# Patient Record
Sex: FEMALE | Race: WHITE | Employment: FULL TIME | URBAN - METROPOLITAN AREA
[De-identification: names, ages, dates, MRNs, and addresses within clinical notes are randomized per-mention and may not be internally consistent; named-entity substitution may affect disease eponyms.]

---

## 2017-10-27 ENCOUNTER — HOSPITAL ENCOUNTER (OUTPATIENT)
Dept: NON INVASIVE DIAGNOSTICS | Age: 52
Discharge: HOME | End: 2017-10-27
Attending: INTERNAL MEDICINE | Admitting: INTERNAL MEDICINE

## 2017-10-27 DIAGNOSIS — I47.1 SUPRAVENTRICULAR TACHYCARDIA (HCC): ICD-10-CM

## 2017-10-27 LAB
LEFT VENTRICULAR EJECTION FRACTION HIGH VALUE: 55 %
LEFT VENTRICULAR EJECTION FRACTION MODE: NORMAL
LV EF: 55 %
LVEF MODALITY: NORMAL

## 2017-10-31 ENCOUNTER — OFFICE VISIT (OUTPATIENT)
Dept: CARDIOLOGY CLINIC | Age: 52
End: 2017-10-31

## 2017-10-31 VITALS
DIASTOLIC BLOOD PRESSURE: 84 MMHG | SYSTOLIC BLOOD PRESSURE: 130 MMHG | BODY MASS INDEX: 23.07 KG/M2 | HEART RATE: 86 BPM | HEIGHT: 67 IN | WEIGHT: 147 LBS

## 2017-10-31 DIAGNOSIS — I51.89 RIGHT ATRIAL MASS: Primary | ICD-10-CM

## 2017-10-31 DIAGNOSIS — I47.1 PSVT (PAROXYSMAL SUPRAVENTRICULAR TACHYCARDIA) (HCC): ICD-10-CM

## 2017-10-31 PROCEDURE — G8484 FLU IMMUNIZE NO ADMIN: HCPCS | Performed by: INTERNAL MEDICINE

## 2017-10-31 PROCEDURE — 3014F SCREEN MAMMO DOC REV: CPT | Performed by: INTERNAL MEDICINE

## 2017-10-31 PROCEDURE — G8420 CALC BMI NORM PARAMETERS: HCPCS | Performed by: INTERNAL MEDICINE

## 2017-10-31 PROCEDURE — G8427 DOCREV CUR MEDS BY ELIG CLIN: HCPCS | Performed by: INTERNAL MEDICINE

## 2017-10-31 PROCEDURE — 3017F COLORECTAL CA SCREEN DOC REV: CPT | Performed by: INTERNAL MEDICINE

## 2017-10-31 PROCEDURE — 99244 OFF/OP CNSLTJ NEW/EST MOD 40: CPT | Performed by: INTERNAL MEDICINE

## 2017-10-31 NOTE — PROGRESS NOTES
[address]  Cardiac Consult     Referring Provider:  Mann Pitt     Chief Complaint   Patient presents with   Luis Coley Cardiologist     Pt had SVT in the ED a couple weeks ago. Other then that she states she has not felt any cardiac symptoms        History of Present Illness:  48 y/o former smoker seen in consultation at the request of Dr. Carlos Siddiqui for abnormal ECHO suggesting myxoma. She has had episodes of dizziness with no associated symptoms. A couple of weeks ago she had a severe episode and went to the ER and was found to have SVT at 200 bpm. She was placed on lopressor and has not had any recurrence. No clear exacerbating factors. Episodes occurring over the last couple months. She saw her PCP and an ECHO was obtained. This suggested a Right atrial myxoma. However, she does have a large atrial septal aneurysm that could be the etiology of the abnormality. Past Medical History:   has a past medical history of Anxiety. Surgical History:   has a past surgical history that includes shoulder surgery; back surgery; and  section. Social History:   reports that she quit smoking about 5 months ago. Her smoking use included E-Cigarettes and Cigarettes. She has never used smokeless tobacco. She reports that she drinks about 1.2 oz of alcohol per week . She reports that she does not use drugs. Family History:  family history includes Coronary Art Dis in her maternal grandfather; High Blood Pressure in her father; Other in her maternal grandfather.      Allergies:  Codeine and Penicillins     Home Medications:  Outpatient Encounter Prescriptions as of 10/31/2017   Medication Sig Dispense Refill    BUSPIRONE HCL PO Take by mouth      Albuterol Sulfate (PROAIR HFA IN) Inhale into the lungs      meloxicam (MOBIC) 15 MG tablet Take 15 mg by mouth daily      Tiotropium Bromide Monohydrate (SPIRIVA HANDIHALER IN) Inhale into the lungs      buPROPion (WELLBUTRIN XL) 300 MG

## 2017-10-31 NOTE — LETTER
Outpatient Encounter Prescriptions as of 10/31/2017   Medication Sig Dispense Refill    BUSPIRONE HCL PO Take by mouth      Albuterol Sulfate (PROAIR HFA IN) Inhale into the lungs      meloxicam (MOBIC) 15 MG tablet Take 15 mg by mouth daily      Tiotropium Bromide Monohydrate (SPIRIVA HANDIHALER IN) Inhale into the lungs      buPROPion (WELLBUTRIN XL) 300 MG extended release tablet Take 300 mg by mouth every morning      metoprolol succinate (TOPROL XL) 25 MG extended release tablet Take 1 tablet by mouth daily 15 tablet 0    Cyclobenzaprine HCl (FLEXERIL PO) Take 10 mg by mouth 3 times daily as needed       No facility-administered encounter medications on file as of 10/31/2017. Medications and dosages reviewed. ROS:  Full ROS obtained and negative except as mentioned in HPI      Physical Examination:    Vitals:    10/31/17 1115   BP: 130/84   Pulse: 86        · GENERAL: Well developed, well nourished, No acute distress  · NEUROLOGICAL: Alert and oriented  · PSYCH: Calm affect  · SKIN: Warm and dry  · HEENT: Normocephalic, Sclera non-icteric, mucus membranes moist  · NECK: supple, JVP normal  · CAROTID: Normal upstroke, no bruits  · CARDIAC: Normal PMI, regular rate and rhythm, normal S1S2, no murmur, rub, or gallop  · RESPIRATORY: Normal respiratory effort, clear to auscultation bilaterally  · EXTREMITIES: No edema  · MUSCULOSKELETAL: No joint swelling or tenderness, no chest wall tenderness  · GASTROINTESTINAL: normal bowel sounds, soft, non-tender, no bruit      Assessment:     Right atrial mass: Concerning for myxoma. However, this could be artifactual due to her atrial septal aneurysm. I have recommended SHLOMO. Discussed in detail with pt and family including risks and benefits. She understands and wishes to proceed. No h/o esophageal pathology and no swallowing difficulty. SVT: currently resolved on lopressor.  Follow    Plan:    SHLOMO Thank you for allowing me to participate in the care of this individual.      Rody Hernández M.D., [facility]         If you have questions, please do not hesitate to call me. I look forward to following Francheska Ford along with you.     Sincerely,        Sebastian Collier MD

## 2017-11-01 ENCOUNTER — HOSPITAL ENCOUNTER (OUTPATIENT)
Dept: MAMMOGRAPHY | Age: 52
Discharge: HOME | End: 2017-11-01

## 2017-11-01 DIAGNOSIS — Z12.39 BREAST CANCER SCREENING: ICD-10-CM

## 2017-11-01 NOTE — COMMUNICATION BODY
extended release tablet Take 300 mg by mouth every morning      metoprolol succinate (TOPROL XL) 25 MG extended release tablet Take 1 tablet by mouth daily 15 tablet 0    Cyclobenzaprine HCl (FLEXERIL PO) Take 10 mg by mouth 3 times daily as needed       No facility-administered encounter medications on file as of 10/31/2017. [x] Medications and dosages reviewed. ROS:  [x]Full ROS obtained and negative except as mentioned in HPI      Physical Examination:    Vitals:    10/31/17 1115   BP: 130/84   Pulse: 86        · GENERAL: Well developed, well nourished, No acute distress  · NEUROLOGICAL: Alert and oriented  · PSYCH: Calm affect  · SKIN: Warm and dry  · HEENT: Normocephalic, Sclera non-icteric, mucus membranes moist  · NECK: supple, JVP normal  · CAROTID: Normal upstroke, no bruits  · CARDIAC: Normal PMI, regular rate and rhythm, normal S1S2, no murmur, rub, or gallop  · RESPIRATORY: Normal respiratory effort, clear to auscultation bilaterally  · EXTREMITIES: No edema  · MUSCULOSKELETAL: No joint swelling or tenderness, no chest wall tenderness  · GASTROINTESTINAL: normal bowel sounds, soft, non-tender, no bruit      Assessment:     Right atrial mass: Concerning for myxoma. However, this could be artifactual due to her atrial septal aneurysm. I have recommended SHLOMO. Discussed in detail with pt and family including risks and benefits. She understands and wishes to proceed. No h/o esophageal pathology and no swallowing difficulty. SVT: currently resolved on lopressor.  Follow    Plan:    SHLOMO    Thank you for allowing me to participate in the care of this individual.      Margarita Jameson M.D., [facility] [place]

## 2017-11-16 ENCOUNTER — OFFICE VISIT (OUTPATIENT)
Dept: CARDIOLOGY CLINIC | Age: 52
End: 2017-11-16

## 2017-11-16 VITALS
DIASTOLIC BLOOD PRESSURE: 86 MMHG | BODY MASS INDEX: 23.39 KG/M2 | OXYGEN SATURATION: 95 % | HEART RATE: 93 BPM | WEIGHT: 149 LBS | HEIGHT: 67 IN | SYSTOLIC BLOOD PRESSURE: 124 MMHG

## 2017-11-16 DIAGNOSIS — I51.89 RIGHT ATRIAL MASS: Primary | ICD-10-CM

## 2017-11-16 DIAGNOSIS — I47.1 SVT (SUPRAVENTRICULAR TACHYCARDIA) (HCC): ICD-10-CM

## 2017-11-16 PROCEDURE — 99213 OFFICE O/P EST LOW 20 MIN: CPT | Performed by: NURSE PRACTITIONER

## 2017-11-16 PROCEDURE — 1036F TOBACCO NON-USER: CPT | Performed by: NURSE PRACTITIONER

## 2017-11-16 PROCEDURE — G8484 FLU IMMUNIZE NO ADMIN: HCPCS | Performed by: NURSE PRACTITIONER

## 2017-11-16 PROCEDURE — 93000 ELECTROCARDIOGRAM COMPLETE: CPT | Performed by: NURSE PRACTITIONER

## 2017-11-16 PROCEDURE — 3017F COLORECTAL CA SCREEN DOC REV: CPT | Performed by: NURSE PRACTITIONER

## 2017-11-16 PROCEDURE — G8427 DOCREV CUR MEDS BY ELIG CLIN: HCPCS | Performed by: NURSE PRACTITIONER

## 2017-11-16 PROCEDURE — 3014F SCREEN MAMMO DOC REV: CPT | Performed by: NURSE PRACTITIONER

## 2017-11-16 PROCEDURE — G8420 CALC BMI NORM PARAMETERS: HCPCS | Performed by: NURSE PRACTITIONER

## 2017-11-16 RX ORDER — ADHESIVE BANDAGE 3/4"
1 BANDAGE TOPICAL DAILY
Qty: 1 EACH | Refills: 0 | Status: SHIPPED | OUTPATIENT
Start: 2017-11-16

## 2017-11-16 NOTE — LETTER
[address] [address] [address] [address]  Phone: [phone]  Fax: [phone]    Lorenzo Parr NP        November 16, 2017     Michael Mcneal, [address] [place] [address]  [id number]    Patient: Rosa Vallecillo  MR Number: [id number]  YOB: 1965  Date of Visit: 11/16/2017    Dear Dr. Michael Mcneal:    Thank you for the request for consultation for Russell Cogan to me for the evaluation of SVT. Below are the relevant portions of my assessment and plan of care. [address]     Outpatient Follow Up Note    CHIEF COMPLAINT / HPI: Follow Up secondary to  Atrial septal aneurysm/ SVT     Rosa Vallecillo is 47 y.o. female who presents today for a routine follow up  related to the above mentioned issues. Subjective:   Since the time of last office visit, the patient admits their symptoms have improved. At today's visit the patient is doing well. Patient recently s/p SHLOMO to r/o RA mass which showed no mass and large atrial septal aneurysm   She denies any chest pain, palpitations, SOB, dizziness, or edema. Today's EKG showed SR. With regard to medication therapy the patient has been compliant with prescribed regimen. They have tolerated therapy to date.      Past Medical History:   Diagnosis Date    Anxiety      Social History:    History   Smoking Status    Former Smoker    Types: E-Cigarettes, Cigarettes    Quit date: 5/31/2017   Smokeless Tobacco    Never Used     Current Medications:  Current Outpatient Prescriptions   Medication Sig Dispense Refill    metoprolol succinate (TOPROL XL) 25 MG extended release tablet Take 1 tablet by mouth daily 30 tablet 5    BUSPIRONE HCL PO Take by mouth      Albuterol Sulfate (PROAIR HFA IN) Inhale into the lungs      Cyclobenzaprine HCl (FLEXERIL PO) Take 10 mg by mouth 3 times daily as needed      meloxicam (MOBIC) 15 MG tablet Take 15 mg by mouth daily  Tiotropium Bromide Monohydrate (SPIRIVA HANDIHALER IN) Inhale into the lungs      buPROPion (WELLBUTRIN XL) 300 MG extended release tablet Take 300 mg by mouth every morning       No current facility-administered medications for this visit. REVIEW OF SYSTEMS:   CONSTITUTIONAL: No major weight gain or loss, fatigue, weakness, night sweats or fever. There's been no change in energy level, sleep pattern, or activity level. HEENT: No new vision difficulties or ringing in the ears. RESPIRATORY: No new SOB, PND, orthopnea or cough. CARDIOVASCULAR: See HPI  GI: No nausea, vomiting, diarrhea, constipation, abdominal pain or changes in bowel habits. : No urinary frequency, urgency, incontinence hematuria or dysuria. SKIN: No cyanosis or skin lesions. MUSCULOSKELETAL: No new muscle or joint pain. NEUROLOGICAL: No syncope or TIA-like symptoms. PSYCHIATRIC: No anxiety, pain, insomnia or depression    Objective:   PHYSICAL EXAM:        VITALS:    Vitals:    11/16/17 1412   BP: 124/86   Pulse: 93   SpO2: 95%         CONSTITUTIONAL: Cooperative, no apparent distress, and appears well nourished / developed  NEUROLOGIC:  Awake and orientated to person, place and time. PSYCH: Calm affect. SKIN: Warm and dry. HEENT: Sclera non-icteric, normocephalic, neck supple, no elevation of JVP, normal carotid pulses with no bruits and thyroid normal size. LUNGS:  No increased work of breathing and clear to auscultation, no crackles or wheezing. CARDIOVASCULAR:  Regular rate and rhythm with no murmurs, gallops, rubs, or abnormal heart sounds, normal PMI. The apical impulses not displaced.  Heart tones are crisp and normal. Cervical veins are not engorged                 JVP less than 8 cm H2O                                                                              The carotid upstroke is normal in amplitude and contour without delay or bruit    ABDOMEN:  Normal bowel sounds, non-distended and non-tender to palpation The patient  currently  is not smoking. The risks related to smoking were reviewed with the patient. Recommend maintaining a smoke-free lifestyle. Products available for smoking cessation were discussed. Angiotension inhibitor/angiotension receptor blocker has been prescribed / recommended for congestive heart failure. Daily weight, low sodium diet were discussed. Patient instructed to call the office with a weight gain: > 3 # over night or 5# in one week; swelling, SOB/orthopnea/PND    Thank you for allowing to us to participate in the care of Mckenna Phillips. [facility] CNP         If you have questions, please do not hesitate to call me. I look forward to following Jameel Iniguez along with you.     Sincerely,        Ruth Oh NP

## 2017-11-16 NOTE — PROGRESS NOTES
[facility]     Outpatient Follow Up Note    CHIEF COMPLAINT / HPI: Follow Up secondary to  Atrial septal aneurysm/ SVT     Klaudia Chang is 47 y.o. female who presents today for a routine follow up  related to the above mentioned issues. Subjective:   Since the time of last office visit, the patient admits their symptoms have improved. At today's visit the patient is doing well. Patient recently s/p SHLOMO to r/o RA mass which showed no mass and large atrial septal aneurysm   She denies any chest pain, palpitations, SOB, dizziness, or edema. Today's EKG showed SR. With regard to medication therapy the patient has been compliant with prescribed regimen. They have tolerated therapy to date. Past Medical History:   Diagnosis Date    Anxiety      Social History:    History   Smoking Status    Former Smoker    Types: E-Cigarettes, Cigarettes    Quit date: 5/31/2017   Smokeless Tobacco    Never Used     Current Medications:  Current Outpatient Prescriptions   Medication Sig Dispense Refill    metoprolol succinate (TOPROL XL) 25 MG extended release tablet Take 1 tablet by mouth daily 30 tablet 5    BUSPIRONE HCL PO Take by mouth      Albuterol Sulfate (PROAIR HFA IN) Inhale into the lungs      Cyclobenzaprine HCl (FLEXERIL PO) Take 10 mg by mouth 3 times daily as needed      meloxicam (MOBIC) 15 MG tablet Take 15 mg by mouth daily      Tiotropium Bromide Monohydrate (SPIRIVA HANDIHALER IN) Inhale into the lungs      buPROPion (WELLBUTRIN XL) 300 MG extended release tablet Take 300 mg by mouth every morning       No current facility-administered medications for this visit. REVIEW OF SYSTEMS:   CONSTITUTIONAL: No major weight gain or loss, fatigue, weakness, night sweats or fever. There's been no change in energy level, sleep pattern, or activity level. HEENT: No new vision difficulties or ringing in the ears. RESPIRATORY: No new SOB, PND, orthopnea or cough.    CARDIOVASCULAR: See 10/07/2017    HCT 40.0 10/07/2017    .3 (H) 10/07/2017     10/07/2017     Radiology Review:  Pertinent images / reports were reviewed as a part of this visit and reveals the following:  SHLOMO: 11/6/17   Indication: RA Mass  Prelim Findings:  Normal LV  Large atrial septal aneurysm  PFO with right to left shunt by bubble stuby  Aortic sclerosis  No RA mass-Presumed mass on ECHO was Atrial septal aneurysm protruding into RA     Plan:     Home today  F/u 3-4 weeks with NP for SVT  11/16/17 EKG showed SR reviewed by me     Assessment:     Right atrial mass: Concerning for myxoma. However, this could be artifactual due to her atrial septal aneurysm. SHLOMO on 11/6/17  showed no RA mass. Presumed mass on ECHO was Atrial septal aneurysm protruding into RA    SVT  Today's EKG showed SR  Currently on Toprol- XL    Patient  is stable since hospital discharge. Plan:   Continue current medications  Prescription for a B/P check  Follow up in two weeks for a B/P check    I have addressed the patient's cardiac risk factors and adjusted pharmacologic treatment as needed. In addition, I have reinforced the need for patient directed risk factor modification. Further evaluation will be based upon the patient's clinical course and testing results. All questions and concerns were addressed to the patient/family. Alternatives to  treatment were discussed. The patient  currently  is not smoking. The risks related to smoking were reviewed with the patient. Recommend maintaining a smoke-free lifestyle. Products available for smoking cessation were discussed. Angiotension inhibitor/angiotension receptor blocker has been prescribed / recommended for congestive heart failure. Daily weight, low sodium diet were discussed.  Patient instructed to call the office with a weight gain: > 3 # over night or 5# in one week; swelling, SOB/orthopnea/PND    Thank you for allowing to us to participate in the care of Khushboo Hayes

## 2017-11-17 NOTE — COMMUNICATION BODY
HPI  GI: No nausea, vomiting, diarrhea, constipation, abdominal pain or changes in bowel habits. : No urinary frequency, urgency, incontinence hematuria or dysuria. SKIN: No cyanosis or skin lesions. MUSCULOSKELETAL: No new muscle or joint pain. NEUROLOGICAL: No syncope or TIA-like symptoms. PSYCHIATRIC: No anxiety, pain, insomnia or depression    Objective:   PHYSICAL EXAM:        VITALS:    Vitals:    11/16/17 1412   BP: 124/86   Pulse: 93   SpO2: 95%         CONSTITUTIONAL: Cooperative, no apparent distress, and appears well nourished / developed  NEUROLOGIC:  Awake and orientated to person, place and time. PSYCH: Calm affect. SKIN: Warm and dry. HEENT: Sclera non-icteric, normocephalic, neck supple, no elevation of JVP, normal carotid pulses with no bruits and thyroid normal size. LUNGS:  No increased work of breathing and clear to auscultation, no crackles or wheezing. CARDIOVASCULAR:  Regular rate and rhythm with no murmurs, gallops, rubs, or abnormal heart sounds, normal PMI. The apical impulses not displaced. Heart tones are crisp and normal. Cervical veins are not engorged                 JVP less than 8 cm H2O                                                                              The carotid upstroke is normal in amplitude and contour without delay or bruit    ABDOMEN:  Normal bowel sounds, non-distended and non-tender to palpation   EXT: No edema, no calf tenderness. Pulses are present bilaterally.     DATA:    Lab Results   Component Value Date    ALT 64 (H) 10/07/2017    AST 42 (H) 10/07/2017    ALKPHOS 129 10/07/2017    BILITOT 0.3 10/07/2017     Lab Results   Component Value Date    CREATININE 0.7 10/07/2017    BUN 16 10/07/2017     10/07/2017    K 3.7 10/07/2017    CL 97 (L) 10/07/2017    CO2 22 10/07/2017     Lab Results   Component Value Date    TSH 6.01 (H) 10/07/2017    L0EVSQP 3.8 (L) 10/07/2017     Lab Results   Component Value Date    WBC 7.7 10/07/2017    HGB 13.5 10/07/2017    HCT 40.0 10/07/2017    .3 (H) 10/07/2017     10/07/2017     Radiology Review:  Pertinent images / reports were reviewed as a part of this visit and reveals the following:  SHLOMO: 11/6/17   Indication: RA Mass  Prelim Findings:  Normal LV  Large atrial septal aneurysm  PFO with right to left shunt by bubble stuby  Aortic sclerosis  No RA mass-Presumed mass on ECHO was Atrial septal aneurysm protruding into RA     Plan:     Home today  F/u 3-4 weeks with NP for SVT  11/16/17 EKG showed SR reviewed by me     Assessment:     Right atrial mass: Concerning for myxoma. However, this could be artifactual due to her atrial septal aneurysm. SHLOMO on 11/6/17  showed no RA mass. Presumed mass on ECHO was Atrial septal aneurysm protruding into RA    SVT  Today's EKG showed SR  Currently on Toprol- XL    Patient  is stable since hospital discharge. Plan:   Continue current medications  Prescription for a B/P check  Follow up in two weeks for a B/P check    I have addressed the patient's cardiac risk factors and adjusted pharmacologic treatment as needed. In addition, I have reinforced the need for patient directed risk factor modification. Further evaluation will be based upon the patient's clinical course and testing results. All questions and concerns were addressed to the patient/family. Alternatives to  treatment were discussed. The patient  currently  is not smoking. The risks related to smoking were reviewed with the patient. Recommend maintaining a smoke-free lifestyle. Products available for smoking cessation were discussed. Angiotension inhibitor/angiotension receptor blocker has been prescribed / recommended for congestive heart failure. Daily weight, low sodium diet were discussed.  Patient instructed to call the office with a weight gain: > 3 # over night or 5# in one week; swelling, SOB/orthopnea/PND    Thank you for allowing to us to participate in the care of Art Leone Maxwell Huff.       [facility]

## 2017-12-04 ENCOUNTER — TELEPHONE (OUTPATIENT)
Dept: CARDIOLOGY CLINIC | Age: 52
End: 2017-12-04

## 2017-12-05 ENCOUNTER — TELEPHONE (OUTPATIENT)
Dept: CARDIOLOGY CLINIC | Age: 52
End: 2017-12-05

## 2018-02-05 ENCOUNTER — TELEPHONE (OUTPATIENT)
Dept: CARDIOLOGY CLINIC | Age: 53
End: 2018-02-05

## 2018-02-05 NOTE — TELEPHONE ENCOUNTER
Spoke with patient let her know per Apolinar Griffith we are unable to fill out disability paperwork we received.  Patient states understanding

## 2018-05-07 RX ORDER — METOPROLOL SUCCINATE 25 MG/1
TABLET, EXTENDED RELEASE ORAL
Qty: 30 TABLET | Refills: 5 | Status: SHIPPED | OUTPATIENT
Start: 2018-05-07 | End: 2018-09-07 | Stop reason: SDUPTHER

## 2018-09-07 RX ORDER — METOPROLOL SUCCINATE 25 MG/1
TABLET, EXTENDED RELEASE ORAL
Qty: 30 TABLET | Refills: 1 | Status: SHIPPED | OUTPATIENT
Start: 2018-09-07 | End: 2018-11-14 | Stop reason: SDUPTHER

## 2018-11-06 ENCOUNTER — HOSPITAL ENCOUNTER (OUTPATIENT)
Dept: WOMENS IMAGING | Age: 53
Discharge: HOME | End: 2018-11-06
Payer: COMMERCIAL

## 2018-11-06 DIAGNOSIS — Z12.39 BREAST CANCER SCREENING: ICD-10-CM

## 2018-11-06 PROCEDURE — 77067 SCR MAMMO BI INCL CAD: CPT

## 2018-11-14 RX ORDER — METOPROLOL SUCCINATE 25 MG/1
TABLET, EXTENDED RELEASE ORAL
Qty: 90 TABLET | Refills: 0 | Status: SHIPPED | OUTPATIENT
Start: 2018-11-14 | End: 2019-03-08 | Stop reason: SDUPTHER

## 2018-12-23 ENCOUNTER — HOSPITAL ENCOUNTER (EMERGENCY)
Age: 53
Discharge: HOME | End: 2018-12-23
Attending: EMERGENCY MEDICINE
Payer: COMMERCIAL

## 2018-12-23 VITALS
OXYGEN SATURATION: 100 % | RESPIRATION RATE: 18 BRPM | TEMPERATURE: 97.1 F | HEIGHT: 67 IN | SYSTOLIC BLOOD PRESSURE: 157 MMHG | WEIGHT: 150 LBS | HEART RATE: 100 BPM | BODY MASS INDEX: 23.54 KG/M2 | DIASTOLIC BLOOD PRESSURE: 83 MMHG

## 2018-12-23 DIAGNOSIS — R10.9 ABDOMINAL PAIN, UNSPECIFIED ABDOMINAL LOCATION: Primary | ICD-10-CM

## 2018-12-23 DIAGNOSIS — R21 RASH AND OTHER NONSPECIFIC SKIN ERUPTION: ICD-10-CM

## 2018-12-23 LAB
A/G RATIO: 1.3 (ref 1.1–2.2)
ALBUMIN SERPL-MCNC: 4 G/DL (ref 3.4–5)
ALP BLD-CCNC: 132 U/L (ref 40–129)
ALT SERPL-CCNC: 13 U/L (ref 10–40)
ANION GAP SERPL CALCULATED.3IONS-SCNC: 9 MMOL/L (ref 3–16)
AST SERPL-CCNC: 17 U/L (ref 15–37)
BACTERIA: ABNORMAL /HPF
BASOPHILS ABSOLUTE: 0 K/UL (ref 0–0.2)
BASOPHILS RELATIVE PERCENT: 0.6 %
BILIRUB SERPL-MCNC: 0.3 MG/DL (ref 0–1)
BILIRUBIN URINE: NEGATIVE
BLOOD, URINE: NEGATIVE
BUN BLDV-MCNC: 10 MG/DL (ref 7–20)
CALCIUM SERPL-MCNC: 9.4 MG/DL (ref 8.3–10.6)
CHLORIDE BLD-SCNC: 106 MMOL/L (ref 99–110)
CLARITY: CLEAR
CO2: 25 MMOL/L (ref 21–32)
COLOR: YELLOW
CREAT SERPL-MCNC: 0.6 MG/DL (ref 0.6–1.1)
EOSINOPHILS ABSOLUTE: 0.2 K/UL (ref 0–0.6)
EOSINOPHILS RELATIVE PERCENT: 3.2 %
EPITHELIAL CELLS, UA: 5 /HPF (ref 0–5)
GFR AFRICAN AMERICAN: >60
GFR NON-AFRICAN AMERICAN: >60
GLOBULIN: 3.2 G/DL
GLUCOSE BLD-MCNC: 89 MG/DL (ref 70–99)
GLUCOSE URINE: NEGATIVE MG/DL
HCT VFR BLD CALC: 36.7 % (ref 36–48)
HEMOGLOBIN: 12 G/DL (ref 12–16)
HYALINE CASTS: 2 /LPF (ref 0–8)
KETONES, URINE: NEGATIVE MG/DL
LEUKOCYTE ESTERASE, URINE: ABNORMAL
LYMPHOCYTES ABSOLUTE: 0.7 K/UL (ref 1–5.1)
LYMPHOCYTES RELATIVE PERCENT: 13.3 %
MCH RBC QN AUTO: 33.1 PG (ref 26–34)
MCHC RBC AUTO-ENTMCNC: 32.8 G/DL (ref 31–36)
MCV RBC AUTO: 100.9 FL (ref 80–100)
MICROSCOPIC EXAMINATION: YES
MONOCYTES ABSOLUTE: 0.6 K/UL (ref 0–1.3)
MONOCYTES RELATIVE PERCENT: 11.3 %
NEUTROPHILS ABSOLUTE: 3.9 K/UL (ref 1.7–7.7)
NEUTROPHILS RELATIVE PERCENT: 71.6 %
NITRITE, URINE: NEGATIVE
PDW BLD-RTO: 14.1 % (ref 12.4–15.4)
PH UA: 5.5
PLATELET # BLD: 277 K/UL (ref 135–450)
PMV BLD AUTO: 8.1 FL (ref 5–10.5)
POTASSIUM REFLEX MAGNESIUM: 4.1 MMOL/L (ref 3.5–5.1)
PROTEIN UA: NEGATIVE MG/DL
RBC # BLD: 3.64 M/UL (ref 4–5.2)
RBC UA: 1 /HPF (ref 0–4)
SODIUM BLD-SCNC: 140 MMOL/L (ref 136–145)
SPECIFIC GRAVITY UA: 1.02
TOTAL PROTEIN: 7.2 G/DL (ref 6.4–8.2)
URINE REFLEX TO CULTURE: YES
URINE TYPE: ABNORMAL
UROBILINOGEN, URINE: 0.2 E.U./DL
WBC # BLD: 5.4 K/UL (ref 4–11)
WBC UA: 2 /HPF (ref 0–5)

## 2018-12-23 PROCEDURE — 87086 URINE CULTURE/COLONY COUNT: CPT

## 2018-12-23 PROCEDURE — 99283 EMERGENCY DEPT VISIT LOW MDM: CPT

## 2018-12-23 PROCEDURE — 80053 COMPREHEN METABOLIC PANEL: CPT

## 2018-12-23 PROCEDURE — 81001 URINALYSIS AUTO W/SCOPE: CPT

## 2018-12-23 PROCEDURE — 85025 COMPLETE CBC W/AUTO DIFF WBC: CPT

## 2018-12-23 NOTE — ED PROVIDER NOTES
Grandfather         CABG and Pacemaker    Coronary Art Dis Maternal Grandfather        SOCIAL HISTORY    Social History     Social History    Marital status:      Spouse name: N/A    Number of children: N/A    Years of education: N/A     Social History Main Topics    Smoking status: Former Smoker     Types: E-Cigarettes, Cigarettes     Quit date: 5/31/2017    Smokeless tobacco: Never Used    Alcohol use 1.2 oz/week     2 Cans of beer per week    Drug use: No    Sexual activity: Not Asked     Other Topics Concern    None     Social History Narrative    None       PHYSICAL EXAM    VITAL SIGNS: BP (!) 157/83   Pulse 100   Temp 97.1 °F (36.2 °C) (Oral)   Resp 18   Ht 5' 7\" (1.702 m)   Wt 150 lb (68 kg)   SpO2 100%   BMI 23.49 kg/m²   Constitutional:  Well developed, well nourished, no acute distress, non-toxic appearance   Eyes:  PERRL, conjunctiva normal   HENT:  Atraumatic, external ears normal, nose normal, oropharynx moist. Neck supple   Respiratory:  No respiratory distress, normal breath sounds   Cardiovascular:  Normal rate, normal rhythm, no murmurs, no gallops, no rubs   GI:  Soft nontender with no masses. Bowel sounds positive   :  Deferred   Musculoskeletal:  No edema   Integument:  Well hydrated   Neurologic:  Alert & oriented x 3, no focal deficits     EKG        RADIOLOGY/PROCEDURES        ED COURSE & MEDICAL DECISION MAKING    Pertinent Labs & Imaging studies reviewed. (See chart for details)   This patient has benign emergency Department record. Abdomen is completely nonsurgical.  She is afebrile and nontoxic with normal vital signs. Laboratories are unremarkable. Repeat abdominal examination again revealed nonsurgical.  Urine is not infected and is no focal tenderness to suggest appendicitis, cholecystitis, bowel obstruction, or pancreatitis. Laboratories do not support these diagnoses either.   As far as the rash on her face, it is in the distribution around her eyes as

## 2018-12-24 LAB — URINE CULTURE, ROUTINE: NORMAL

## 2019-03-08 RX ORDER — METOPROLOL SUCCINATE 25 MG/1
TABLET, EXTENDED RELEASE ORAL
Qty: 90 TABLET | Refills: 0 | Status: SHIPPED | OUTPATIENT
Start: 2019-03-08 | End: 2019-04-19 | Stop reason: SDUPTHER

## 2019-04-19 ENCOUNTER — OFFICE VISIT (OUTPATIENT)
Dept: CARDIOLOGY CLINIC | Age: 54
End: 2019-04-19
Payer: COMMERCIAL

## 2019-04-19 VITALS
DIASTOLIC BLOOD PRESSURE: 86 MMHG | SYSTOLIC BLOOD PRESSURE: 132 MMHG | HEIGHT: 67 IN | WEIGHT: 150 LBS | HEART RATE: 83 BPM | BODY MASS INDEX: 23.54 KG/M2

## 2019-04-19 DIAGNOSIS — I47.1 SVT (SUPRAVENTRICULAR TACHYCARDIA) (HCC): ICD-10-CM

## 2019-04-19 DIAGNOSIS — I35.8 AORTIC VALVE SCLEROSIS: Primary | ICD-10-CM

## 2019-04-19 DIAGNOSIS — I25.3 ATRIAL SEPTAL ANEURYSM: ICD-10-CM

## 2019-04-19 PROCEDURE — G8427 DOCREV CUR MEDS BY ELIG CLIN: HCPCS | Performed by: INTERNAL MEDICINE

## 2019-04-19 PROCEDURE — 4004F PT TOBACCO SCREEN RCVD TLK: CPT | Performed by: INTERNAL MEDICINE

## 2019-04-19 PROCEDURE — 3017F COLORECTAL CA SCREEN DOC REV: CPT | Performed by: INTERNAL MEDICINE

## 2019-04-19 PROCEDURE — G8420 CALC BMI NORM PARAMETERS: HCPCS | Performed by: INTERNAL MEDICINE

## 2019-04-19 PROCEDURE — 99213 OFFICE O/P EST LOW 20 MIN: CPT | Performed by: INTERNAL MEDICINE

## 2019-04-19 RX ORDER — METOPROLOL SUCCINATE 25 MG/1
TABLET, EXTENDED RELEASE ORAL
Qty: 90 TABLET | Refills: 3 | Status: SHIPPED | OUTPATIENT
Start: 2019-04-19

## 2019-04-19 NOTE — PROGRESS NOTES
[x]Full ROS obtained and negative except as mentioned in HPI      Physical Examination:    Vitals:    04/19/19 0910   BP: 132/86   Pulse: 83      /86 (Site: Right Upper Arm, Position: Sitting, Cuff Size: Medium Adult)   Pulse 83   Ht 5' 7\" (1.702 m)   Wt 150 lb (68 kg)   BMI 23.49 kg/m²     · GENERAL: Well developed, well nourished, No acute distress  · NEUROLOGICAL: Alert and oriented  · PSYCH: Calm affect  · SKIN: Warm and dry, no rash  · HEENT: Normocephalic, Sclera non-icteric, mucus membranes moist  · NECK: supple, JVP normal  · CAROTID: Normal upstroke, no bruits  · CARDIAC: Normal PMI, regular rate and rhythm, normal S1S2, no murmur, rub, or gallop  · RESPIRATORY: Normal respiratory effort, clear to auscultation bilaterally  · EXTREMITIES: No LE edema  · MUSCULOSKELETAL: No joint swelling or tenderness, no chest wall tenderness  · GASTROINTESTINAL: normal bowel sounds, soft, non-tender, no bruit      Assessment:       SVT:   Remains controlled on lopressor  Refill    Plan:    Refill meds  OK to f/u with prn if PCP will refill lopressor in future    Thank you for allowing me to participate in the care of this individual.      Hermes Nelson M.D., [address] [facility]

## 2019-11-08 ENCOUNTER — HOSPITAL ENCOUNTER (OUTPATIENT)
Dept: WOMENS IMAGING | Age: 54
Discharge: HOME | End: 2019-11-08
Payer: COMMERCIAL

## 2019-11-08 DIAGNOSIS — Z80.3 FAMILY HISTORY OF MALIGNANT NEOPLASM OF BREAST: ICD-10-CM

## 2019-11-08 DIAGNOSIS — Z80.3 FAMILY HISTORY OF BREAST CANCER IN MOTHER: ICD-10-CM

## 2019-11-08 DIAGNOSIS — Z12.31 BREAST CANCER SCREENING BY MAMMOGRAM: ICD-10-CM

## 2019-11-08 PROCEDURE — 77063 BREAST TOMOSYNTHESIS BI: CPT

## 2020-11-09 ENCOUNTER — HOSPITAL ENCOUNTER (OUTPATIENT)
Dept: WOMENS IMAGING | Age: 55
Discharge: HOME | End: 2020-11-09
Payer: COMMERCIAL

## 2020-11-09 PROCEDURE — 77063 BREAST TOMOSYNTHESIS BI: CPT
